# Patient Record
Sex: FEMALE | Race: OTHER | NOT HISPANIC OR LATINO
[De-identification: names, ages, dates, MRNs, and addresses within clinical notes are randomized per-mention and may not be internally consistent; named-entity substitution may affect disease eponyms.]

---

## 2020-09-10 PROBLEM — Z00.00 ENCOUNTER FOR PREVENTIVE HEALTH EXAMINATION: Status: ACTIVE | Noted: 2020-09-10

## 2020-09-15 ENCOUNTER — APPOINTMENT (OUTPATIENT)
Dept: SURGICAL ONCOLOGY | Facility: CLINIC | Age: 50
End: 2020-09-15
Payer: MEDICAID

## 2020-09-15 VITALS
OXYGEN SATURATION: 99 % | DIASTOLIC BLOOD PRESSURE: 82 MMHG | WEIGHT: 192 LBS | BODY MASS INDEX: 31.99 KG/M2 | HEART RATE: 123 BPM | HEIGHT: 65 IN | SYSTOLIC BLOOD PRESSURE: 116 MMHG | TEMPERATURE: 98.6 F

## 2020-09-15 DIAGNOSIS — Z86.39 PERSONAL HISTORY OF OTHER ENDOCRINE, NUTRITIONAL AND METABOLIC DISEASE: ICD-10-CM

## 2020-09-15 DIAGNOSIS — Z86.79 PERSONAL HISTORY OF OTHER DISEASES OF THE CIRCULATORY SYSTEM: ICD-10-CM

## 2020-09-15 DIAGNOSIS — C78.7 MALIGNANT NEOPLASM OF COLON, UNSPECIFIED: ICD-10-CM

## 2020-09-15 DIAGNOSIS — Z78.9 OTHER SPECIFIED HEALTH STATUS: ICD-10-CM

## 2020-09-15 DIAGNOSIS — Z86.018 PERSONAL HISTORY OF OTHER BENIGN NEOPLASM: ICD-10-CM

## 2020-09-15 DIAGNOSIS — Z80.0 FAMILY HISTORY OF MALIGNANT NEOPLASM OF DIGESTIVE ORGANS: ICD-10-CM

## 2020-09-15 DIAGNOSIS — C18.9 MALIGNANT NEOPLASM OF COLON, UNSPECIFIED: ICD-10-CM

## 2020-09-15 DIAGNOSIS — Z80.3 FAMILY HISTORY OF MALIGNANT NEOPLASM OF BREAST: ICD-10-CM

## 2020-09-15 PROCEDURE — 99204 OFFICE O/P NEW MOD 45 MIN: CPT

## 2020-09-15 RX ORDER — OLMESARTAN MEDOXOMIL / AMLODIPINE BESYLATE / HYDROCHLOROTHIAZIDE 10; 12.5; 4 MG/1; MG/1; MG/1
40-10-12.5 TABLET, FILM COATED ORAL
Refills: 0 | Status: ACTIVE | COMMUNITY

## 2020-09-15 RX ORDER — INSULIN GLARGINE 100 [IU]/ML
INJECTION, SOLUTION SUBCUTANEOUS
Refills: 0 | Status: ACTIVE | COMMUNITY

## 2020-09-15 NOTE — HISTORY OF PRESENT ILLNESS
[de-identified] : Patient Name: STEFFEN MARTIN \par MRN: [id number] \par [place] MRN:\par Referring Provider: Dr. Amadeo Avila\par Oncologist: Dr. Avila\par Date: 9/15/2020\par \par Diagnosis: metastatic colon cancer, to liver\par \par 50 year female  presents for evaluation of metastatic colon cancer to liver. She was diagnosed in 2010 [id number] + high risk features, KRAS mutated, s/p resection @ [facility] by Dr. Wright in 12/2010 and adjuvant FOLFOX x 6 months. Liver recurrence in 2012. S/p resection by Dr. Wright and adjuvant FOLFIRI for 6 months. Liver recurrence in 2014, s/p resection by Dr. Wright.Was then on "maintenance" FOLFOX from [id number].Chemo stopped when remained MIQUEL by imag in fall of 2018. remained off chemo till October 2019, when had liver recurrence again. She got 4 cycles of chemo with FOLFOX, and was explored in Jan 2020 but closed and deemed unresectable. Unclear why. Switched oncologists to Dr Avila. Started on FOLFIRI with avastin. She had a CT CAP on 4/23/2020 and again CT CAP 6/22/20 showed liver lesions similar in size compared to April 2020. Pet CT done 7/29 again showed multiple liver lesions, with a dominant right lobe lesion measuring 4.7cm. She is here for a surgical consultation.\par \par Currently, Ms. MARTIN denies abdominal pain and discomfort, not having decreased appetite, and no nausea or vomiting. \par \par Functional Status: Ms. MARTIN is able to walk without fatigue or dyspnea.\par

## 2020-09-15 NOTE — ASSESSMENT
[FreeTextEntry1] : I) Metastatic colon ca to liver (third recurrence)\par \par P) Long discussion abut clinical findings.Clearly has recurrent disease on last 3 studies w a dominant right sided lesion (actually in segment 4 which is hypertrophied and rotated to patient right) and what appears to be 3 lesions in remainder of left lobe (segment 2,3) measuring about 1cm. Ostensibly this is all resectable/ablatable especially in a young person with no major comorbidties. Unclear why the open and close this past january. Will review the actual PET films when delivered and obtain previous opnotes. based on what I discover will follow up with patient on whether another attempt at clearing all disease is warranted. All questons answered.\par \par Ky Morris MD\par \par Chief Surgical Oncology\par Multidisciplinary GI cancer program\par [facility]\par [facility]\par \par Professor Surgery\par [employer]\par

## 2020-09-15 NOTE — RESULTS/DATA
[FreeTextEntry1] : Diagnostic Studies\par \par Date: 7/29/2020\par Study: PET CT ([facility])\par Results: FDG avid hepatic masses most compatible with metastatic disease. These correlate with the prior CT findings. The dominant right hepatic laterally located lesion appear smaller compared to the prior exam, although exact comparison is limited without contrast administration. Other findings as described.\par \par Date: 6/22/2020\par Study: CT CAP W ([facility])\par Results: status post right hemicolectomy with right partial hepatectomy. There are hepatic lesions similar in size and distribution compared to prior exam, suspicious for metastatic lesions. Follow up can be obtained using PETCT scan.\par \par \par Date: 4/24/2020 ([facility])\par Study: CT CAP W\par Results: Multiple hepatic masses most suspicious for metastatic lesions. The largest is at the posterior right hepatic lobe. Truncated appearance of the posterior right hepatic lobe which may be compatible with prior surgery. Status post right hemicolectomy. Anterior abdominal scarring is present. There is a right sided small bowel containing paraumbilical hernia. Surgical consultation may be warranted. A complete obstruction is not present at this time. Significant amount of retained colonic stool. Anterior right pelvic mass of uncertain etiology with similar appearance of that of uterine leiomyomas, possibly pedunculated one. There is a leiomyomatous uterus. Right sided venous access port with lying distal tip in the SVC/right atrial junction. Atelectatic changes of the right middle and lower lobe. \par \par Labs:\par \par Date: 8/27/2020\par Results: , Albumin 4.3, TBili 0.2, Alk Phos 104, AST 31, ALT 25, H/H 11.6/36.0\par \par Pathology: No new results to review\par \par \par

## 2020-09-15 NOTE — REVIEW OF SYSTEMS
[Negative] : Genitourinary [FreeTextEntry8] : As per HPI [de-identified] : +Dark spots on face [de-identified] : +DM [FreeTextEntry1] : +Occasional bleeding from rectum

## 2020-09-15 NOTE — PHYSICAL EXAM
[Normal Neck Lymph Nodes] : normal neck lymph nodes  [Normal Supraclavicular Lymph Nodes] : normal supraclavicular lymph nodes [Normal] : grossly intact [de-identified] : Anicteric [de-identified] : S1,S2, regular rate and rhythm. No murmurs heard. [de-identified] : Clear throughout. No wheezes heard. [de-identified] : Midline and Transverse surgical scars are well healed, no palpable masses [de-identified] : Warm, dry

## 2020-10-10 ENCOUNTER — APPOINTMENT (OUTPATIENT)
Dept: MRI IMAGING | Facility: CLINIC | Age: 50
End: 2020-10-10
Payer: MEDICAID

## 2020-10-10 ENCOUNTER — OUTPATIENT (OUTPATIENT)
Dept: OUTPATIENT SERVICES | Facility: HOSPITAL | Age: 50
LOS: 1 days | End: 2020-10-10

## 2020-10-10 ENCOUNTER — RESULT REVIEW (OUTPATIENT)
Age: 50
End: 2020-10-10

## 2020-10-10 PROCEDURE — 74183 MRI ABD W/O CNTR FLWD CNTR: CPT | Mod: 26

## 2025-01-14 ENCOUNTER — APPOINTMENT (OUTPATIENT)
Dept: ORTHOPEDIC SURGERY | Facility: CLINIC | Age: 55
End: 2025-01-14